# Patient Record
Sex: FEMALE | URBAN - METROPOLITAN AREA
[De-identification: names, ages, dates, MRNs, and addresses within clinical notes are randomized per-mention and may not be internally consistent; named-entity substitution may affect disease eponyms.]

---

## 2019-04-11 ENCOUNTER — NURSE TRIAGE (OUTPATIENT)
Dept: NURSING | Facility: CLINIC | Age: 44
End: 2019-04-11

## 2019-04-11 NOTE — TELEPHONE ENCOUNTER
I explained that it's when they go into the groin area and up to the heart. They then use a balloon to increase the size of the artery or veins that have decreased circulation in the heart to avoid an heart attack. She wanted to know what the risks are. I told her infection, bleeding, death.  Karo Ramirez RN-[facility]